# Patient Record
Sex: FEMALE | Race: OTHER | HISPANIC OR LATINO
[De-identification: names, ages, dates, MRNs, and addresses within clinical notes are randomized per-mention and may not be internally consistent; named-entity substitution may affect disease eponyms.]

---

## 2023-05-22 PROBLEM — Z00.00 ENCOUNTER FOR PREVENTIVE HEALTH EXAMINATION: Status: ACTIVE | Noted: 2023-05-22

## 2023-05-23 ENCOUNTER — APPOINTMENT (OUTPATIENT)
Dept: UROGYNECOLOGY | Facility: CLINIC | Age: 73
End: 2023-05-23
Payer: COMMERCIAL

## 2023-05-23 VITALS
TEMPERATURE: 98 F | OXYGEN SATURATION: 99 % | SYSTOLIC BLOOD PRESSURE: 150 MMHG | HEART RATE: 68 BPM | DIASTOLIC BLOOD PRESSURE: 80 MMHG | BODY MASS INDEX: 34.93 KG/M2 | WEIGHT: 185 LBS | HEIGHT: 61 IN

## 2023-05-23 DIAGNOSIS — N95.2 POSTMENOPAUSAL ATROPHIC VAGINITIS: ICD-10-CM

## 2023-05-23 DIAGNOSIS — R39.11 HESITANCY OF MICTURITION: ICD-10-CM

## 2023-05-23 LAB
BILIRUB UR QL STRIP: NORMAL
CLARITY UR: CLEAR
COLLECTION METHOD: NORMAL
GLUCOSE UR-MCNC: NORMAL
HCG UR QL: 0.2 EU/DL
HGB UR QL STRIP.AUTO: NORMAL
KETONES UR-MCNC: NORMAL
LEUKOCYTE ESTERASE UR QL STRIP: NORMAL
NITRITE UR QL STRIP: NORMAL
PH UR STRIP: 5.5
PROT UR STRIP-MCNC: NORMAL
SP GR UR STRIP: 1.02

## 2023-05-23 PROCEDURE — 99204 OFFICE O/P NEW MOD 45 MIN: CPT | Mod: 25

## 2023-05-23 PROCEDURE — 51736 URINE FLOW MEASUREMENT: CPT

## 2023-05-23 PROCEDURE — 51725 SIMPLE CYSTOMETROGRAM: CPT

## 2023-05-23 PROCEDURE — 81003 URINALYSIS AUTO W/O SCOPE: CPT | Mod: NC,QW

## 2023-05-23 NOTE — HISTORY OF PRESENT ILLNESS
[FreeTextEntry1] : 72-year-old with previous hysterectomy for pelvic prolapse decades ago returns for recurrent pelvic prolapse symptoms. She has pressure for mechanical sensation will ball extruding from the [place] is uncomfortable when walking.  \par \par The secondary complaint of urinary frequency and urgency voiding everyone to 1 1/2 hours. She is urinary hesitancy but denies incontinence.\par \par \par History:   HTN, chlesterol, weight\par \par physical examination\par \par General physical examination is normal\par BMI is increased\par the abdomen soft nontender with no masses\par extremities no edema or pain\par \par \par \par Sterile urethral prep was performed and patient was catheterized.  The residual volume was 30cc\par \par DIAGNOSTIC PROCEDURE: SIMPLE CYSTOMETRY :\par  -diagnostic test indicated as general examination, history, urine analysis and  microscopy failed to explain patient complaints\par \par In the supine position, the urethra was prepped with Betadine. A 16 Gu[place] catheter was gently passed into the bladder with sterile technique and secured in place.\par A urine sample was obtained via catheterization. Sterile water was infused by gravity via an irrigation syringe.\par Patient sensation, change in flow rate, and capacity were observed: \par \par First Sensation    60      (cc)      First Urgency 110       Increase Urge   180      Capacity    180    \par Pain with Fill:  NEGATIVE   \par Sensory Urgency:       negative    mild   moderate   severe\par Post Fill Void     162(cc):           \par \par Uroflowmetry : slow stream with peak flow at 9 mL per second but voiding is nearly complete                \par \par Involuntary detrusor contractions:  none  favoring contractions present\par \par Cough Stress Test :    negative at capacity and prolapse reduced\par \par Diagnoses: \par \par Post hysterectomy stage III vaginally prolapse.\par Agile atrophy\par voiding dysfunction manifests a slow stream with low peak urinary flow rate but no incontinence\par urinary frequency possibly related to functional reasons and possibly related to anatomic hypermobility and kinking of the urethra\par \par \par The patient's young age and active lifestyle she would like to reconstructive surgery. We did discuss the possibility of a pessary but I don't feel that her anatomy is favorable for one or two decade use of the pessary is aware this is certainly an option with surgery to follow is an option if that's unsatisfactory.\par \par She would like a surgical consult.\par \par The patient expressed some frustration I am unable to perform her surgery as a no longer doing formal full pelvic reconstructive surgery.. I explained that her referring physician Dr. Marvin likely respected my opinion as to appropriate management and assured her that the examination I had today her subsequent visits would be expedient the information today would not have to be re-duplicated. I did tell her to expect a repeat gynecologic examination but bladder filling at the initial visit would not be necessary.   I referred her to the [place] your gynecology practice is that is most convenient for her geographically\par \par \par \par \par \par \par

## 2023-05-23 NOTE — LETTER BODY
[Dear  ___] : Dear  [unfilled], [I had the pleasure of evaluating your patient, [unfilled]. Thank you for referring Ms. [unfilled] for consultation for ___] : I had the pleasure of evaluating your patient, [unfilled]. Thank you for referring Ms. [unfilled] for consultation for [unfilled]. [Attached please find my note.] : Attached please find my note. [FreeTextEntry1] : S

## 2023-06-09 ENCOUNTER — APPOINTMENT (OUTPATIENT)
Dept: UROGYNECOLOGY | Facility: CLINIC | Age: 73
End: 2023-06-09
Payer: COMMERCIAL

## 2023-06-09 VITALS
WEIGHT: 185 LBS | BODY MASS INDEX: 34.93 KG/M2 | SYSTOLIC BLOOD PRESSURE: 145 MMHG | DIASTOLIC BLOOD PRESSURE: 82 MMHG | HEIGHT: 61 IN

## 2023-06-09 DIAGNOSIS — K59.09 OTHER CONSTIPATION: ICD-10-CM

## 2023-06-09 PROCEDURE — 99214 OFFICE O/P EST MOD 30 MIN: CPT

## 2023-06-13 PROBLEM — K59.09 CONSTIPATION, CHRONIC: Status: ACTIVE | Noted: 2023-06-13

## 2023-06-13 NOTE — HISTORY OF PRESENT ILLNESS
[FreeTextEntry1] : ROMULO PUGA is a 72 year old P3 presenting for evaluation of prolapse. She was seen by Dr. Rich and referred for surgical consultation as is she not interested in conservative management. She reports discomfort from the prolapse, for example while walking, and feels pressure. She also had some urinary frequency and urgency, but denies any urinary incontinence. She voids 10 times during the daytime and 5-6 times at night. She also reports chronic constipation.\par \par She reports a history of vaginal hysterectomy in the , presumably for prolapse. Does not remember what was done for suspension. \par \par PMH: HTN, HLD, IBS?, diverticulitis, constipation.\par PSH: vaginal hysterectomy/prolapse repair\par OBGYN Hx:  x 3\par Social Hx: non-smoker

## 2023-06-13 NOTE — DISCUSSION/SUMMARY
[FreeTextEntry1] : I counseled Anya on the risk factors and etiologies of pelvic organ prolapse. We reviewed management options, which included continued observation, Kegels and/or pelvic floor physical therapy, pessary, and surgery. We reviewed abdominal and vaginal approach to surgical intervention as well as native tissue versus graft-augmented reconstructions. We also reviewed alternative of vaginal closure. We reviewed details of each procedure, risks and benefits, as well as expectation for long term success. We also discussed that there are always a possibility of recurrence regardless of the chosen approach. We reviewed the importance of management of risk factors as well to reduce risk of recurrent. Patient suffers from constipation, therefore we reviewed management of this, including adequate hydration, fiber supplementation, and potential starting a bowel regimen with stool softener/laxative. For prolapse, she is interested in surgical management. We reviewed the need for a urodynamic study. She is uncertain of her preferred approach at this time and will take some time to think about the options presented today. IUGA handouts of surgical options were provided. We also discussed management of urinary symptoms, particularly with nocturia, avoidance of bladder irritants at nighttime as well as avoidance of fluid intake 2-3 hours prior to bedtime. She is also currently taking Amlodipine, which may be contributing to increased urine production as nighttime. Will follow-up after UDS.

## 2023-06-13 NOTE — PHYSICAL EXAM
[Normal Appearance] : general appearance was normal [Atrophy] : atrophy [Normal] : was normal [Aa ____] : Aa [unfilled] [Ba ____] : Ba [unfilled] [C ____] : C [unfilled] [GH ____] : GH [unfilled] [PB ____] : PB [unfilled] [TVL ____] : TVL  [unfilled] [Ap ____] : Ap [unfilled] [Bp ____] : Bp [unfilled] [Absent] : absent [No Acute Distress] : in no acute distress [Well developed] : well developed [Well Nourished] : ~L well nourished [Good Hygeine] : demonstrates good hygeine [Oriented x3] : oriented to person, place, and time [Normal Memory] : ~T memory was ~L unimpaired [Normal Mood/Affect] : mood and affect are normal [Respirations regular] : ~T respiratory rate was regular [No Edema] : ~T edema was not present [Anxiety] : patient is not anxious [FreeTextEntry3] : +Valsalva leakage

## 2023-06-15 DIAGNOSIS — Z86.39 PERSONAL HISTORY OF OTHER ENDOCRINE, NUTRITIONAL AND METABOLIC DISEASE: ICD-10-CM

## 2023-06-15 DIAGNOSIS — Z82.49 FAMILY HISTORY OF ISCHEMIC HEART DISEASE AND OTHER DISEASES OF THE CIRCULATORY SYSTEM: ICD-10-CM

## 2023-06-15 DIAGNOSIS — Z87.19 PERSONAL HISTORY OF OTHER DISEASES OF THE DIGESTIVE SYSTEM: ICD-10-CM

## 2023-06-15 DIAGNOSIS — Z82.3 FAMILY HISTORY OF STROKE: ICD-10-CM

## 2023-06-15 DIAGNOSIS — Z78.9 OTHER SPECIFIED HEALTH STATUS: ICD-10-CM

## 2023-06-15 DIAGNOSIS — Z83.3 FAMILY HISTORY OF DIABETES MELLITUS: ICD-10-CM

## 2023-06-15 DIAGNOSIS — Z86.79 PERSONAL HISTORY OF OTHER DISEASES OF THE CIRCULATORY SYSTEM: ICD-10-CM

## 2023-06-15 RX ORDER — AMLODIPINE BESYLATE 10 MG/1
10 TABLET ORAL
Refills: 0 | Status: ACTIVE | COMMUNITY

## 2023-06-15 RX ORDER — MULTIVIT-MIN/IRON/FOLIC ACID/K 18-600-40
400 CAPSULE ORAL
Refills: 0 | Status: ACTIVE | COMMUNITY

## 2023-06-15 RX ORDER — MULTIVITAMIN
TABLET ORAL
Refills: 0 | Status: ACTIVE | COMMUNITY

## 2023-06-15 RX ORDER — ASPIRIN 81 MG/1
TABLET, DELAYED RELEASE ORAL
Refills: 0 | Status: ACTIVE | COMMUNITY

## 2023-06-15 RX ORDER — IRBESARTAN 150 MG/1
150 TABLET ORAL
Refills: 0 | Status: ACTIVE | COMMUNITY

## 2023-06-15 RX ORDER — ROSUVASTATIN CALCIUM 20 MG/1
20 TABLET, FILM COATED ORAL
Refills: 0 | Status: ACTIVE | COMMUNITY

## 2023-06-15 RX ORDER — GEMFIBROZIL 600 MG/1
600 TABLET, FILM COATED ORAL
Refills: 0 | Status: ACTIVE | COMMUNITY

## 2023-06-30 ENCOUNTER — APPOINTMENT (OUTPATIENT)
Dept: UROGYNECOLOGY | Facility: CLINIC | Age: 73
End: 2023-06-30
Payer: COMMERCIAL

## 2023-06-30 VITALS
WEIGHT: 185 LBS | DIASTOLIC BLOOD PRESSURE: 84 MMHG | HEART RATE: 75 BPM | HEIGHT: 61 IN | SYSTOLIC BLOOD PRESSURE: 146 MMHG | BODY MASS INDEX: 34.93 KG/M2

## 2023-06-30 DIAGNOSIS — N39.41 URGE INCONTINENCE: ICD-10-CM

## 2023-06-30 DIAGNOSIS — N32.81 OVERACTIVE BLADDER: ICD-10-CM

## 2023-06-30 PROCEDURE — 51784 ANAL/URINARY MUSCLE STUDY: CPT

## 2023-06-30 PROCEDURE — 51729 CYSTOMETROGRAM W/VP&UP: CPT

## 2023-06-30 PROCEDURE — 51797 INTRAABDOMINAL PRESSURE TEST: CPT

## 2023-07-12 ENCOUNTER — NON-APPOINTMENT (OUTPATIENT)
Age: 73
End: 2023-07-12

## 2023-07-12 ENCOUNTER — APPOINTMENT (OUTPATIENT)
Dept: UROGYNECOLOGY | Facility: CLINIC | Age: 73
End: 2023-07-12
Payer: COMMERCIAL

## 2023-07-12 DIAGNOSIS — Z87.39 PERSONAL HISTORY OF OTHER DISEASES OF THE MUSCULOSKELETAL SYSTEM AND CONNECTIVE TISSUE: ICD-10-CM

## 2023-07-12 DIAGNOSIS — M54.9 DORSALGIA, UNSPECIFIED: ICD-10-CM

## 2023-07-12 PROCEDURE — 99214 OFFICE O/P EST MOD 30 MIN: CPT | Mod: 25

## 2023-07-12 PROCEDURE — 81003 URINALYSIS AUTO W/O SCOPE: CPT | Mod: NC,QW

## 2023-07-12 PROCEDURE — 51701 INSERT BLADDER CATHETER: CPT

## 2023-07-13 ENCOUNTER — APPOINTMENT (OUTPATIENT)
Dept: UROGYNECOLOGY | Facility: CLINIC | Age: 73
End: 2023-07-13
Payer: COMMERCIAL

## 2023-07-13 VITALS
DIASTOLIC BLOOD PRESSURE: 72 MMHG | HEART RATE: 66 BPM | HEIGHT: 61 IN | BODY MASS INDEX: 34.93 KG/M2 | WEIGHT: 185 LBS | SYSTOLIC BLOOD PRESSURE: 121 MMHG

## 2023-07-13 DIAGNOSIS — N81.2 INCOMPLETE UTEROVAGINAL PROLAPSE: ICD-10-CM

## 2023-07-13 PROCEDURE — 99214 OFFICE O/P EST MOD 30 MIN: CPT

## 2023-07-13 NOTE — DISCUSSION/SUMMARY
[FreeTextEntry1] : We reviewed surgical management options again today, including abdominal and vaginal approach, as well as native tissue versus graft-augmented reconstruction with SSLF. We again reviewed details of each procedure, risks and benefits, as well as expectation for long term success. We also discussed that there are always a possibility of recurrence regardless of the chosen approach. We reviewed the need for a urodynamic study results which did not show any evidence of JOSE, however incontinence with urgency was present (though no distinct DO was visible). We discussed that despite absence of JOSE on UDS, there is still a possibility of developing de gris JOSE. At this time she continues to desire surgical intervention and would like to proceed with a sacrospinous ligament fixation and APR. Will proceed with scheduling.

## 2023-07-13 NOTE — HISTORY OF PRESENT ILLNESS
[FreeTextEntry1] : Pt presents for follow up of prolapse. S/p UDS notable for urgency leakage ([facility] 200 m) but no evidence of occult JOSE, although unable to fill bladder beyond 200 mL.

## 2023-07-14 PROBLEM — Z78.9 CAFFEINE USE: Status: ACTIVE | Noted: 2023-07-14

## 2023-07-14 PROBLEM — R35.0 FREQUENCY OF URINATION: Status: ACTIVE | Noted: 2023-05-23

## 2023-07-14 NOTE — PHYSICAL EXAM
[No Acute Distress] : in no acute distress [Well developed] : well developed [Well Nourished] : ~L well nourished [Good Hygeine] : demonstrates good hygeine [Oriented x3] : oriented to person, place, and time [Normal Memory] : ~T memory was ~L unimpaired [Normal Mood/Affect] : mood and affect are normal [None] : no CVA tenderness [Warm and Dry] : was warm and dry to touch [Normal Gait] : gait was normal [Labia Majora] : were normal [Normal] : was normal [Normal Appearance] : general appearance was normal [Atrophy] : atrophy [No Bleeding] : there was no active vaginal bleeding [Post Void Residual ____ml] : post void residual was [unfilled] ml [Anxiety] : patient is not anxious [Tenderness] : ~T no ~M abdominal tenderness observed [Distended] : not distended

## 2023-07-14 NOTE — DISCUSSION/SUMMARY
[FreeTextEntry1] : R/O UTI:\par \par Udip negative\par Advised patient that back pain is most likely unrelated and that if pain persists she should contact her PCP.\par Patient has follow up with Dr. Valadez on 7/13/23\par Advised her  to call the office should any issues arise

## 2023-07-14 NOTE — PROCEDURE
[Straight Catheterization] : insertion of a straight catheter [Other ___] : [unfilled] [Patient] : the patient [Allergies Reviewed] : Allergies reviewed [___ Fr Straight Tip] : a [unfilled] in [place] straight tip catheter [None] : there were no complications with the catheter insertion [Clear] : clear [No Complications] : no complications [Tolerated Well] : the patient tolerated the procedure well [Post procedure instructions and information given] : Post procedure instructions and information were given and reviewed with patient. [FreeTextEntry1] : urine obtained via straight cath due to vaginal atrophy

## 2023-07-14 NOTE — END OF VISIT
[FreeTextEntry3] : I have reviewed all relevant clinical information, including history, exam, assessment, and plan. I agree with the above note.\par

## 2023-07-14 NOTE — HISTORY OF PRESENT ILLNESS
[FreeTextEntry1] : Anya is a 73 y/o that presents to the office today for UCA. She had a UDS on 6/30/23. She reports that after UDS she had burning with urination that stopped on the third day. She also reports left sided groin pain and left sided back pain. She denies any fever, chills, blood in urine, frequency, urgency or dysuria.

## 2023-07-17 ENCOUNTER — APPOINTMENT (OUTPATIENT)
Dept: UROGYNECOLOGY | Facility: CLINIC | Age: 73
End: 2023-07-17

## 2023-07-17 DIAGNOSIS — R35.0 FREQUENCY OF MICTURITION: ICD-10-CM

## 2023-07-17 DIAGNOSIS — Z78.9 OTHER SPECIFIED HEALTH STATUS: ICD-10-CM

## 2023-07-18 ENCOUNTER — APPOINTMENT (OUTPATIENT)
Dept: ORTHOPEDIC SURGERY | Facility: CLINIC | Age: 73
End: 2023-07-18
Payer: COMMERCIAL

## 2023-07-18 VITALS — WEIGHT: 185 LBS | HEIGHT: 61 IN | BODY MASS INDEX: 34.93 KG/M2

## 2023-07-18 PROCEDURE — 99204 OFFICE O/P NEW MOD 45 MIN: CPT

## 2023-07-18 NOTE — PHYSICAL EXAM
[Bilateral] : foot and ankle bilaterally [Mild] : mild swelling of medial ankle [] : difficulty with single heel rise [[id number]] : dorsiflexion 15 degrees

## 2023-07-18 NOTE — HISTORY OF PRESENT ILLNESS
[9] : 9 [1] : 2 [Burning] : burning [de-identified] : Patient is a 72 year old female who presents today for evaluation of her B/L ankles. Pt states that pain started 1 year ago. Equally bad. Pain localized along b/l medial ankle. Had XR/sonogram done at [facility]. Saw a podiatrist/vascular/ orthopedic for ankles. Denies trauma/previous injury. No numbness. Some tingling at night. Ice to affected area. Did PT for 6 weeks, pt states PT did not help. Recieved orthotics, did not help.  WB in sneakers.  [FreeTextEntry1] : b/l ankles

## 2023-07-18 NOTE — DATA REVIEWED
[Ultrasound] : ultrasound [Foot] : foot [I independently reviewed and interpreted images and report] : I independently reviewed and interpreted images and report [I reviewed the films/CD and agree] : I reviewed the films/CD and agree [FreeTextEntry1] : Bilateral tib post tendinosis

## 2023-08-10 ENCOUNTER — APPOINTMENT (OUTPATIENT)
Dept: UROGYNECOLOGY | Facility: CLINIC | Age: 73
End: 2023-08-10

## 2023-08-24 ENCOUNTER — APPOINTMENT (OUTPATIENT)
Dept: UROGYNECOLOGY | Facility: CLINIC | Age: 73
End: 2023-08-24

## 2023-08-31 ENCOUNTER — APPOINTMENT (OUTPATIENT)
Dept: UROGYNECOLOGY | Facility: CLINIC | Age: 73
End: 2023-08-31

## 2023-09-18 ENCOUNTER — NON-APPOINTMENT (OUTPATIENT)
Age: 73
End: 2023-09-18

## 2023-09-19 ENCOUNTER — APPOINTMENT (OUTPATIENT)
Dept: UROGYNECOLOGY | Facility: CLINIC | Age: 73
End: 2023-09-19
Payer: COMMERCIAL

## 2023-09-19 VITALS
HEIGHT: 61 IN | SYSTOLIC BLOOD PRESSURE: 151 MMHG | WEIGHT: 185 LBS | DIASTOLIC BLOOD PRESSURE: 83 MMHG | HEART RATE: 69 BPM | BODY MASS INDEX: 34.93 KG/M2

## 2023-09-19 DIAGNOSIS — Z01.818 ENCOUNTER FOR OTHER PREPROCEDURAL EXAMINATION: ICD-10-CM

## 2023-09-19 DIAGNOSIS — N81.9 FEMALE GENITAL PROLAPSE, UNSPECIFIED: ICD-10-CM

## 2023-09-19 DIAGNOSIS — N81.6 RECTOCELE: ICD-10-CM

## 2023-09-19 PROCEDURE — 99215 OFFICE O/P EST HI 40 MIN: CPT | Mod: 25

## 2023-09-19 PROCEDURE — 51701 INSERT BLADDER CATHETER: CPT

## 2023-09-21 LAB — BACTERIA UR CULT: NORMAL

## 2023-09-27 ENCOUNTER — OUTPATIENT (OUTPATIENT)
Dept: OUTPATIENT SERVICES | Facility: HOSPITAL | Age: 73
LOS: 1 days | End: 2023-09-27
Payer: COMMERCIAL

## 2023-09-27 VITALS
TEMPERATURE: 97 F | SYSTOLIC BLOOD PRESSURE: 157 MMHG | OXYGEN SATURATION: 97 % | RESPIRATION RATE: 18 BRPM | DIASTOLIC BLOOD PRESSURE: 83 MMHG | WEIGHT: 183.65 LBS | HEIGHT: 61 IN | HEART RATE: 61 BPM

## 2023-09-27 DIAGNOSIS — N81.6 RECTOCELE: ICD-10-CM

## 2023-09-27 DIAGNOSIS — N81.11 CYSTOCELE, MIDLINE: ICD-10-CM

## 2023-09-27 DIAGNOSIS — Z01.818 ENCOUNTER FOR OTHER PREPROCEDURAL EXAMINATION: ICD-10-CM

## 2023-09-27 DIAGNOSIS — N81.2 INCOMPLETE UTEROVAGINAL PROLAPSE: ICD-10-CM

## 2023-09-27 DIAGNOSIS — Z90.710 ACQUIRED ABSENCE OF BOTH CERVIX AND UTERUS: Chronic | ICD-10-CM

## 2023-09-27 LAB
ANION GAP SERPL CALC-SCNC: 12 MMOL/L — SIGNIFICANT CHANGE UP (ref 5–17)
BLD GP AB SCN SERPL QL: NEGATIVE — SIGNIFICANT CHANGE UP
BUN SERPL-MCNC: 21 MG/DL — SIGNIFICANT CHANGE UP (ref 7–23)
CALCIUM SERPL-MCNC: 9.7 MG/DL — SIGNIFICANT CHANGE UP (ref 8.4–10.5)
CHLORIDE SERPL-SCNC: 104 MMOL/L — SIGNIFICANT CHANGE UP (ref 96–108)
CO2 SERPL-SCNC: 23 MMOL/L — SIGNIFICANT CHANGE UP (ref 22–31)
CREAT SERPL-MCNC: 0.98 MG/DL — SIGNIFICANT CHANGE UP (ref 0.5–1.3)
EGFR: 61 ML/MIN/1.73M2 — SIGNIFICANT CHANGE UP
GLUCOSE SERPL-MCNC: 88 MG/DL — SIGNIFICANT CHANGE UP (ref 70–99)
HCT VFR BLD CALC: 45.7 % — HIGH (ref 34.5–45)
HGB BLD-MCNC: 14.8 G/DL — SIGNIFICANT CHANGE UP (ref 11.5–15.5)
MCHC RBC-ENTMCNC: 27.2 PG — SIGNIFICANT CHANGE UP (ref 27–34)
MCHC RBC-ENTMCNC: 32.4 GM/DL — SIGNIFICANT CHANGE UP (ref 32–36)
MCV RBC AUTO: 84 FL — SIGNIFICANT CHANGE UP (ref 80–100)
NRBC # BLD: 0 /100 WBCS — SIGNIFICANT CHANGE UP (ref 0–0)
PLATELET # BLD AUTO: 276 K/UL — SIGNIFICANT CHANGE UP (ref 150–400)
POTASSIUM SERPL-MCNC: 4.4 MMOL/L — SIGNIFICANT CHANGE UP (ref 3.5–5.3)
POTASSIUM SERPL-SCNC: 4.4 MMOL/L — SIGNIFICANT CHANGE UP (ref 3.5–5.3)
RBC # BLD: 5.44 M/UL — HIGH (ref 3.8–5.2)
RBC # FLD: 14.1 % — SIGNIFICANT CHANGE UP (ref 10.3–14.5)
RH IG SCN BLD-IMP: NEGATIVE — SIGNIFICANT CHANGE UP
SODIUM SERPL-SCNC: 139 MMOL/L — SIGNIFICANT CHANGE UP (ref 135–145)
WBC # BLD: 5.24 K/UL — SIGNIFICANT CHANGE UP (ref 3.8–10.5)
WBC # FLD AUTO: 5.24 K/UL — SIGNIFICANT CHANGE UP (ref 3.8–10.5)

## 2023-09-27 PROCEDURE — 86850 RBC ANTIBODY SCREEN: CPT

## 2023-09-27 PROCEDURE — 87086 URINE CULTURE/COLONY COUNT: CPT

## 2023-09-27 PROCEDURE — 86901 BLOOD TYPING SEROLOGIC RH(D): CPT

## 2023-09-27 PROCEDURE — G0463: CPT

## 2023-09-27 PROCEDURE — 86900 BLOOD TYPING SEROLOGIC ABO: CPT

## 2023-09-27 PROCEDURE — 80048 BASIC METABOLIC PNL TOTAL CA: CPT

## 2023-09-27 PROCEDURE — 85027 COMPLETE CBC AUTOMATED: CPT

## 2023-09-27 NOTE — H&P PST ADULT - PRIMARY CARE PROVIDER
[facility] [phone], Cardiology- Matty Watt [phone]

## 2023-09-27 NOTE — H&P PST ADULT - PROBLEM SELECTOR PLAN 2
Continue amlodipine, irbesartan.  Last dose ASA on 10/11/23  Last dose [place] Fish oil 10/11/23

## 2023-09-27 NOTE — H&P PST ADULT - HISTORY OF PRESENT ILLNESS
Ms. Cheung is a 73 year old woman with PMH of HTN, HLD, diverticulitis, cystocele/rectocele/incomplete uterine prolapse scheduled for Sacrospinous ligament fixation, cystoscopy, anterior/posterior colporrhapy repair on 10/18/2023.

## 2023-09-27 NOTE — H&P PST ADULT - NSANTHTOTALSCORECAL_ENT_A_CORE
3 weeks; pt clear for discharge from PT standpoint w/ dtr assist , will continue to advance while in hospital and place on AMB AIDE PROGRAM while in hospital 2

## 2023-09-27 NOTE — H&P PST ADULT - ASSESSMENT
Dental: No loose teeth, no dentures     DASI: Can complete ADL's, moderate activity, not very active other than housework Score: 6.7

## 2023-09-27 NOTE — H&P PST ADULT - PROBLEM SELECTOR PLAN 1
patient scheduled for sacrospinous ligament fixation, anterior/posterior colporrhapy repair, cystoscopy on 10/18/23.  CBC, BMP, A1C, Type and Screen,  Urine C&S done.   Pre-op education provided.  Pending medical clearance 10/9/23.  Type and Screen, ABO on admission.

## 2023-09-27 NOTE — H&P PST ADULT - NSICDXPASTMEDICALHX_GEN_ALL_CORE_FT
PAST MEDICAL HISTORY:  Diverticulitis     Female cystocele     H/O rectocele repair     Hyperlipidemia     Hypertension     Uterine prolapse

## 2023-09-29 LAB
CULTURE RESULTS: SIGNIFICANT CHANGE UP
SPECIMEN SOURCE: SIGNIFICANT CHANGE UP

## 2023-10-03 ENCOUNTER — APPOINTMENT (OUTPATIENT)
Dept: ORTHOPEDIC SURGERY | Facility: CLINIC | Age: 73
End: 2023-10-03
Payer: COMMERCIAL

## 2023-10-03 DIAGNOSIS — M67.979 UNSPECIFIED DISORDER OF SYNOVIUM AND TENDON, UNSPECIFIED ANKLE AND FOOT: ICD-10-CM

## 2023-10-03 PROBLEM — E78.5 HYPERLIPIDEMIA, UNSPECIFIED: Chronic | Status: ACTIVE | Noted: 2023-09-27

## 2023-10-03 PROBLEM — N81.10 CYSTOCELE, UNSPECIFIED: Chronic | Status: ACTIVE | Noted: 2023-09-27

## 2023-10-03 PROBLEM — N81.4 UTEROVAGINAL PROLAPSE, UNSPECIFIED: Chronic | Status: ACTIVE | Noted: 2023-09-27

## 2023-10-03 PROBLEM — Z98.890 OTHER SPECIFIED POSTPROCEDURAL STATES: Chronic | Status: ACTIVE | Noted: 2023-09-27

## 2023-10-03 PROBLEM — K57.92 DIVERTICULITIS OF INTESTINE, PART UNSPECIFIED, WITHOUT PERFORATION OR ABSCESS WITHOUT BLEEDING: Chronic | Status: ACTIVE | Noted: 2023-09-27

## 2023-10-03 PROBLEM — I10 ESSENTIAL (PRIMARY) HYPERTENSION: Chronic | Status: ACTIVE | Noted: 2023-09-27

## 2023-10-03 PROCEDURE — 99213 OFFICE O/P EST LOW 20 MIN: CPT

## 2023-10-12 ENCOUNTER — APPOINTMENT (OUTPATIENT)
Dept: NEUROLOGY | Facility: CLINIC | Age: 73
End: 2023-10-12
Payer: COMMERCIAL

## 2023-10-12 PROCEDURE — 95913 NRV CNDJ TEST 13/> STUDIES: CPT

## 2023-10-12 PROCEDURE — 95886 MUSC TEST DONE W/N TEST COMP: CPT

## 2023-10-17 ENCOUNTER — TRANSCRIPTION ENCOUNTER (OUTPATIENT)
Age: 73
End: 2023-10-17

## 2023-10-18 ENCOUNTER — TRANSCRIPTION ENCOUNTER (OUTPATIENT)
Age: 73
End: 2023-10-18

## 2023-10-18 ENCOUNTER — APPOINTMENT (OUTPATIENT)
Dept: UROGYNECOLOGY | Facility: HOSPITAL | Age: 73
End: 2023-10-18
Payer: COMMERCIAL

## 2023-10-18 ENCOUNTER — OUTPATIENT (OUTPATIENT)
Dept: OUTPATIENT SERVICES | Facility: HOSPITAL | Age: 73
LOS: 1 days | End: 2023-10-18
Payer: COMMERCIAL

## 2023-10-18 VITALS
HEART RATE: 90 BPM | DIASTOLIC BLOOD PRESSURE: 65 MMHG | RESPIRATION RATE: 17 BRPM | SYSTOLIC BLOOD PRESSURE: 129 MMHG | OXYGEN SATURATION: 100 %

## 2023-10-18 VITALS
SYSTOLIC BLOOD PRESSURE: 143 MMHG | DIASTOLIC BLOOD PRESSURE: 83 MMHG | HEART RATE: 88 BPM | RESPIRATION RATE: 16 BRPM | OXYGEN SATURATION: 98 % | WEIGHT: 183.65 LBS | TEMPERATURE: 98 F | HEIGHT: 61 IN

## 2023-10-18 DIAGNOSIS — N81.2 INCOMPLETE UTEROVAGINAL PROLAPSE: ICD-10-CM

## 2023-10-18 DIAGNOSIS — N81.11 CYSTOCELE, MIDLINE: ICD-10-CM

## 2023-10-18 DIAGNOSIS — N81.6 RECTOCELE: ICD-10-CM

## 2023-10-18 DIAGNOSIS — Z90.710 ACQUIRED ABSENCE OF BOTH CERVIX AND UTERUS: Chronic | ICD-10-CM

## 2023-10-18 LAB
BLD GP AB SCN SERPL QL: NEGATIVE — SIGNIFICANT CHANGE UP
BLD GP AB SCN SERPL QL: NEGATIVE — SIGNIFICANT CHANGE UP
RH IG SCN BLD-IMP: NEGATIVE — SIGNIFICANT CHANGE UP
RH IG SCN BLD-IMP: NEGATIVE — SIGNIFICANT CHANGE UP

## 2023-10-18 PROCEDURE — 57282 COLPOPEXY EXTRAPERITONEAL: CPT

## 2023-10-18 PROCEDURE — 86901 BLOOD TYPING SEROLOGIC RH(D): CPT

## 2023-10-18 PROCEDURE — 86900 BLOOD TYPING SEROLOGIC ABO: CPT

## 2023-10-18 PROCEDURE — 57265 CMBN AP COLPRHY W/NTRCL RPR: CPT

## 2023-10-18 PROCEDURE — 86850 RBC ANTIBODY SCREEN: CPT

## 2023-10-18 PROCEDURE — C1889: CPT

## 2023-10-18 PROCEDURE — C9399: CPT

## 2023-10-18 DEVICE — SURGIFLO MATRIX WITH THROMBIN KIT: Type: IMPLANTABLE DEVICE | Status: FUNCTIONAL

## 2023-10-18 RX ORDER — IBUPROFEN 200 MG
1 TABLET ORAL
Qty: 0 | Refills: 0 | DISCHARGE
Start: 2023-10-18

## 2023-10-18 RX ORDER — CEFAZOLIN SODIUM 1 G
2000 VIAL (EA) INJECTION ONCE
Refills: 0 | Status: COMPLETED | OUTPATIENT
Start: 2023-10-18 | End: 2023-10-18

## 2023-10-18 RX ORDER — OXYCODONE HYDROCHLORIDE 5 MG/1
1 TABLET ORAL
Qty: 8 | Refills: 0
Start: 2023-10-18

## 2023-10-18 RX ORDER — HYDROMORPHONE HYDROCHLORIDE 2 MG/ML
0.2 INJECTION INTRAMUSCULAR; INTRAVENOUS; SUBCUTANEOUS
Refills: 0 | Status: DISCONTINUED | OUTPATIENT
Start: 2023-10-18 | End: 2023-10-18

## 2023-10-18 RX ORDER — LIDOCAINE HCL 20 MG/ML
0.2 VIAL (ML) INJECTION ONCE
Refills: 0 | Status: DISCONTINUED | OUTPATIENT
Start: 2023-10-18 | End: 2023-10-18

## 2023-10-18 RX ORDER — AMLODIPINE BESYLATE 2.5 MG/1
1 TABLET ORAL
Refills: 0 | DISCHARGE

## 2023-10-18 RX ORDER — SODIUM CHLORIDE 9 MG/ML
3 INJECTION INTRAMUSCULAR; INTRAVENOUS; SUBCUTANEOUS EVERY 8 HOURS
Refills: 0 | Status: DISCONTINUED | OUTPATIENT
Start: 2023-10-18 | End: 2023-10-18

## 2023-10-18 RX ORDER — SODIUM CHLORIDE 9 MG/ML
1000 INJECTION, SOLUTION INTRAVENOUS
Refills: 0 | Status: DISCONTINUED | OUTPATIENT
Start: 2023-10-18 | End: 2023-11-01

## 2023-10-18 RX ORDER — ZALEPLON 10 MG
1 CAPSULE ORAL
Refills: 0 | DISCHARGE

## 2023-10-18 RX ORDER — GEMFIBROZIL 600 MG
1 TABLET ORAL
Refills: 0 | DISCHARGE

## 2023-10-18 RX ORDER — ROSUVASTATIN CALCIUM 5 MG/1
1 TABLET ORAL
Refills: 0 | DISCHARGE

## 2023-10-18 RX ORDER — ASPIRIN/CALCIUM CARB/MAGNESIUM 324 MG
1 TABLET ORAL
Refills: 0 | DISCHARGE

## 2023-10-18 RX ORDER — IBUPROFEN 200 MG
600 TABLET ORAL EVERY 6 HOURS
Refills: 0 | Status: DISCONTINUED | OUTPATIENT
Start: 2023-10-18 | End: 2023-11-01

## 2023-10-18 RX ORDER — ACETAMINOPHEN 500 MG
975 TABLET ORAL EVERY 6 HOURS
Refills: 0 | Status: DISCONTINUED | OUTPATIENT
Start: 2023-10-18 | End: 2023-11-01

## 2023-10-18 RX ORDER — SODIUM CHLORIDE 9 MG/ML
1000 INJECTION, SOLUTION INTRAVENOUS
Refills: 0 | Status: DISCONTINUED | OUTPATIENT
Start: 2023-10-18 | End: 2023-10-18

## 2023-10-18 RX ORDER — ONDANSETRON 8 MG/1
4 TABLET, FILM COATED ORAL ONCE
Refills: 0 | Status: DISCONTINUED | OUTPATIENT
Start: 2023-10-18 | End: 2023-10-18

## 2023-10-18 RX ORDER — OMEGA-3 ACID ETHYL ESTERS 1 G
1 CAPSULE ORAL
Refills: 0 | DISCHARGE

## 2023-10-18 RX ORDER — ACETAMINOPHEN 500 MG
3 TABLET ORAL
Qty: 0 | Refills: 0 | DISCHARGE
Start: 2023-10-18

## 2023-10-18 RX ORDER — IRBESARTAN 75 MG/1
1 TABLET ORAL
Refills: 0 | DISCHARGE

## 2023-10-18 RX ADMIN — SODIUM CHLORIDE 125 MILLILITER(S): 9 INJECTION, SOLUTION INTRAVENOUS at 14:39

## 2023-10-18 RX ADMIN — SODIUM CHLORIDE 3 MILLILITER(S): 9 INJECTION INTRAMUSCULAR; INTRAVENOUS; SUBCUTANEOUS at 06:39

## 2023-10-18 NOTE — ASU PREOP CHECKLIST - BP NONINVASIVE DIASTOLIC (MM HG)
Patient would like a call back with his MRI results. He said that he hasn't spoke with a nurse yet.    83

## 2023-10-18 NOTE — ASU DISCHARGE PLAN (ADULT/PEDIATRIC) - ASU DC SPECIAL INSTRUCTIONSFT
Regular diet as tolerated, regular activity as tolerated, no heavy lifting until cleared by provider.  Take tylenol, motrin, and oxycodone as needed for pain control.  Do not drive while taking oxycodone.  Nothing per vagina (ie no tampons, douching, or intercourse) until cleared by your doctor.  Shower only, no baths.  Please make an appointment to see your doctor in 2 weeks.  Call your doctor or go to the ED if you have bleeding that does not stop, are unable to urinate, or have a fever >100.4. Regular diet as tolerated, regular activity as tolerated, no heavy lifting until cleared by provider.  Take tylenol, motrin, and oxycodone as needed for pain control.  Do not drive while taking oxycodone.  Nothing per vagina (ie no tampons, douching, or intercourse) until cleared by your doctor.  Shower only, no baths.  Please make an appointment to see your doctor in 2 weeks.  Call your doctor or go to the ED if you have bleeding that does not stop, are unable to urinate, or have a fever >100.4.    Patient may take Miralax for constipation as instructed by attending. Regular diet as tolerated, regular activity as tolerated, no heavy lifting until cleared by provider.  Take Tylenol, motrin, and oxycodone as needed for pain control.  Do not drive while taking oxycodone.  Nothing per vagina (ie no tampons, douching, or intercourse) until cleared by your doctor.  Shower only, no baths.  Please make an appointment to see your doctor in 2 weeks.  Call your doctor or go to the ED if you have bleeding that does not stop, are unable to urinate, or have a fever >100.4.    Patient may take Miralax for constipation as instructed by attending.    Patient to follow up in office as scheduled in two weeks

## 2023-10-18 NOTE — BRIEF OPERATIVE NOTE - NSICDXBRIEFPOSTOP_GEN_ALL_CORE_FT
POST-OP DIAGNOSIS:  Rectocele 18-Oct-2023 12:16:38  Lacy Cruz  Vaginal vault prolapse 18-Oct-2023 12:16:49  Lacy Cruz  Midline cystocele 18-Oct-2023 12:16:43  Lacy Cruz  Enterocele 18-Oct-2023 12:16:32  Lacy Cruz

## 2023-10-18 NOTE — ASU DISCHARGE PLAN (ADULT/PEDIATRIC) - ACTIVITY LEVEL
No exercise/No heavy lifting/No weight bearing/Nothing per rectum/Nothing per vagina/No tub baths/No douching/No tampons/No intercourse

## 2023-10-18 NOTE — ASU PATIENT PROFILE, ADULT - BLOOD TRANSFUSION, PREVIOUS, PROFILE
Message left for mother to return call to office; also told her we would send a portal message.   no

## 2023-10-18 NOTE — ASU DISCHARGE PLAN (ADULT/PEDIATRIC) - NS MD DC FALL RISK RISK
For information on Fall & Injury Prevention, visit: [email or web address] OR  [email or web address] OR  https://www.cdc.gov/steadi/patient.html

## 2023-10-18 NOTE — BRIEF OPERATIVE NOTE - NSICDXBRIEFPROCEDURE_GEN_ALL_CORE_FT
PROCEDURES:  Sacrospinous vault suspension with posterior colporrhaphy and cystoscopy 18-Oct-2023 12:15:18  Lacy Cruz  Anterior colporrhaphy 18-Oct-2023 12:15:25  Lacy Cruz  Vaginal repair of enterocele 18-Oct-2023 12:16:17  Lacy Cruz

## 2023-10-18 NOTE — PROGRESS NOTE ADULT - SUBJECTIVE AND OBJECTIVE BOX
POST-OP CHECK  PA Note    Allergies    No Known Allergies    Intolerance      S: Pt awake and alert resting comfortably in bed  Pain controlled. Pt denies N/V, SOB, CP, palpitations.  Denies Flatus.  Pt currently NPO, Lopes in place    O:   T(C): 36 (10-18-23 @ 14:30), Max: 36 (10-18-23 @ 14:30)  HR: 90 (10-18-23 @ 15:00) (88 - 95)  BP: 129/65 (10-18-23 @ 15:00) (108/57 - 159/74)  RR: 17 (10-18-23 @ 15:00) (17 - 20)  SpO2: 100% (10-18-23 @ 15:00) (98% - 100%)  I&O's Summary    18 Oct 2023 07:01  -  18 Oct 2023 17:01  --------------------------------------------------------  IN: 615 mL / OUT: 375 mL / NET: 240 mL                          EBL    150  Urine output in PACU : 100cc over 2 hours    TOV Note    Active Trial of Void performed.   300cc NS instilled into the bladder by gravity.  Lopes D/C'd  Pt voided 275   cc   Lopes catheter  to remain out.          Pt tolerated well      CV: RRR  Lungs: CTA B/L  Abd: hypoactive BS, soft, appropriately tender  Ext: SCDs in place, no calf tenderness    A/P: 73y Female S/P Sacrospinous vault suspension, anterior and posterior repair, vaginal repair of enterocele, cystoscopy  with PMHx of  Hypertension      Hyperlipidemia      Diverticulitis      Uterine prolapse      H/O rectocele repair      Female cystocele      S/P hysterectomy      Vaginal delivery          -Neuro - AAO x 3, Pain well controlled   -Heme -clinicallyy hemodynamically stable  -CV - asymptomatic  -Pulm - encourage IS, O2sat 99% on RA  -GI - Diet-  Regula Advance as Tolerated  - - passed TOV  -DVT prophylaxis - SCDs in place, encourage ambulation  -Meds:  acetaminophen     Tablet .. 975 milliGRAM(s) Oral every 6 hours PRN  ibuprofen  Tablet. 600 milliGRAM(s) Oral every 6 hours PRN  lactated ringers. 1000 milliLiter(s) IV Continuous <Continuous>    -Dispo: stable for discharge from PACU, once meeting all PACU milestones    Kaitlin Thao PA-C         POST-OP CHECK  PA Note    Allergies    No Known Allergies    Intolerance      S: Pt awake and alert resting comfortably in bed  Pain controlled. Pt denies N/V, SOB, CP, palpitations.  Denies Flatus.  Pt currently NPO, Lopes in place    O:   T(C): 36 (10-18-23 @ 14:30), Max: 36 (10-18-23 @ 14:30)  HR: 90 (10-18-23 @ 15:00) (88 - 95)  BP: 129/65 (10-18-23 @ 15:00) (108/57 - 159/74)  RR: 17 (10-18-23 @ 15:00) (17 - 20)  SpO2: 100% (10-18-23 @ 15:00) (98% - 100%)  I&O's Summary    18 Oct 2023 07:01  -  18 Oct 2023 17:01  --------------------------------------------------------  IN: 615 mL / OUT: 375 mL / NET: 240 mL                          EBL    150  Urine output in PACU : 100cc over 2 hours    TOV Note  Vaginal packing removed  Active Trial of Void performed.   300cc NS instilled into the bladder by gravity.  Lopes D/C'd  Pt voided 275   cc   Lopes catheter  to remain out.          Pt tolerated well      CV: RRR  Lungs: CTA B/L  Abd: hypoactive BS, soft, appropriately tender  Ext: SCDs in place, no calf tenderness    A/P: 73y Female S/P Sacrospinous vault suspension, anterior and posterior repair, vaginal repair of enterocele, cystoscopy  with PMHx of  Hypertension      Hyperlipidemia      Diverticulitis      Uterine prolapse      H/O rectocele repair      Female cystocele      S/P hysterectomy      Vaginal delivery          -Neuro - AAO x 3, Pain well controlled   -Heme -clinically hemodynamically stable  -CV - asymptomatic  -Pulm - encourage IS, O2sat 99% on RA  -GI - Diet-  Regular Advance as Tolerated  - - passed TOV, vaginal packing removed  -DVT prophylaxis - SCDs in place, encourage ambulation  -Meds:  acetaminophen     Tablet .. 975 milliGRAM(s) Oral every 6 hours PRN  ibuprofen  Tablet. 600 milliGRAM(s) Oral every 6 hours PRN  lactated ringers. 1000 milliLiter(s) IV Continuous <Continuous>    -Dispo: stable for discharge from PACU, once meeting all PACU milestones    Kaitlin Thao PA-C

## 2023-10-18 NOTE — BRIEF OPERATIVE NOTE - OPERATION/FINDINGS
Incomplete vaginal vault prolapse. Sacrospinous ligament fixation performed via posterior approach. Enterocele reduced. On cystoscopy, normal bladder mucosa and no suture, mesh, injury,  or foreign body noted.  Bilateral ureteral orifices were identified and effluxing clear yellow urine.

## 2023-10-18 NOTE — ASU PATIENT PROFILE, ADULT - FALL HARM RISK - UNIVERSAL INTERVENTIONS
Bed in lowest position, wheels locked, appropriate side rails in place/Call bell, personal items and telephone in reach/Instruct patient to call for assistance before getting out of bed or chair/Non-slip footwear when patient is out of bed/[place] to call system/Physically safe environment - no spills, clutter or unnecessary equipment/Purposeful Proactive Rounding/Room/bathroom lighting operational, light cord in reach

## 2023-10-18 NOTE — ASU DISCHARGE PLAN (ADULT/PEDIATRIC) - CARE PROVIDER_API CALL
Vicente Valadez  Urogynecology  [address], [address]  [place] [address]  Phone: [phone]  Fax: [phone]  Follow Up Time:

## 2023-10-18 NOTE — BRIEF OPERATIVE NOTE - NSICDXBRIEFPREOP_GEN_ALL_CORE_FT
PRE-OP DIAGNOSIS:  Enterocele 18-Oct-2023 12:16:02  Lacy Cruz  Rectocele 18-Oct-2023 12:15:54  Lacy Cruz  Midline cystocele 18-Oct-2023 12:15:49  Lacy Cruz  Vaginal vault prolapse 18-Oct-2023 12:15:35  Lacy Cruz

## 2023-10-19 ENCOUNTER — NON-APPOINTMENT (OUTPATIENT)
Age: 73
End: 2023-10-19

## 2023-11-01 ENCOUNTER — APPOINTMENT (OUTPATIENT)
Dept: UROGYNECOLOGY | Facility: CLINIC | Age: 73
End: 2023-11-01
Payer: COMMERCIAL

## 2023-11-01 PROCEDURE — 99024 POSTOP FOLLOW-UP VISIT: CPT

## 2023-11-29 ENCOUNTER — APPOINTMENT (OUTPATIENT)
Dept: UROGYNECOLOGY | Facility: CLINIC | Age: 73
End: 2023-11-29
Payer: COMMERCIAL

## 2023-11-29 DIAGNOSIS — Z98.890 OTHER SPECIFIED POSTPROCEDURAL STATES: ICD-10-CM

## 2023-11-29 PROCEDURE — 99024 POSTOP FOLLOW-UP VISIT: CPT

## 2024-02-15 ENCOUNTER — APPOINTMENT (OUTPATIENT)
Dept: UROGYNECOLOGY | Facility: CLINIC | Age: 74
End: 2024-02-15

## 2024-02-16 NOTE — HISTORY OF PRESENT ILLNESS
[FreeTextEntry1] : Pt presents for follow-up. S/p SSLF, APE repair 4.5 months ago.  Was having some leakage likely due to JOSE and she was considering pelvic floor PT. Was also having nocturia and we discussed behavioral modifications.

## 2024-03-20 ENCOUNTER — APPOINTMENT (OUTPATIENT)
Dept: UROGYNECOLOGY | Facility: CLINIC | Age: 74
End: 2024-03-20
Payer: COMMERCIAL

## 2024-03-20 VITALS — HEART RATE: 66 BPM | OXYGEN SATURATION: 95 % | SYSTOLIC BLOOD PRESSURE: 149 MMHG | DIASTOLIC BLOOD PRESSURE: 83 MMHG

## 2024-03-20 DIAGNOSIS — N81.11 CYSTOCELE, MIDLINE: ICD-10-CM

## 2024-03-20 DIAGNOSIS — N39.3 STRESS INCONTINENCE (FEMALE) (MALE): ICD-10-CM

## 2024-03-20 PROCEDURE — 99212 OFFICE O/P EST SF 10 MIN: CPT

## 2024-03-20 NOTE — PHYSICAL EXAM
[Chaperone Present] : A chaperone was present in the examining room during all aspects of the physical examination [Well developed] : well developed [No Acute Distress] : in no acute distress [Well Nourished] : ~L well nourished [Oriented x3] : oriented to person, place, and time [Normal Mood/Affect] : mood and affect are normal [Normal Memory] : ~T memory was ~L unimpaired [Respirations regular] : ~T respiratory rate was regular [No Edema] : ~T edema was not present [Tenderness] : ~T no ~M abdominal tenderness observed [Distended] : not distended [Normal Appearance] : general appearance was normal [Atrophy] : atrophy [Normal] : was normal [Aa ____] : Aa [unfilled] [Ba ____] : Ba [unfilled] [C ____] : C [unfilled] [Ap ____] : Ap [unfilled] [Bp ____] : Bp [unfilled] [Absent] : absent

## 2024-03-20 NOTE — DISCUSSION/SUMMARY
[FreeTextEntry1] : Pt doing well. Mild cystocele on exam, however good apical support. Reports that JOSE symptoms are manageable and does not desire any intervention. All questions answered, aware to return with any questions or concerns. May continue follow-up for GYN for routine gynecologic care. RTO PRN.

## 2024-03-20 NOTE — HISTORY OF PRESENT ILLNESS
[FreeTextEntry1] : Pt presents for check-up. She is s/p SSLF, APE repair in 10/2023. At last post op visit 3 months ago, she reported experiencing symptoms of JOSE. We planned on initiating pelvic floor exercises. She reports today that symptoms have improved and are not bothersome enough that it would require any interventions. She reports occasional sensation of pressure? the lower abdomen when she strains during bowel movement and wants to ensure that prolapse has not returned. She denies any sensation of vaginal pressure or bulge. Denies any pain. Reports that her constipation is better controlled. She takes MiraLAX daily and stool softeners on occasion. She is more intentional about not straining.
